# Patient Record
Sex: MALE | URBAN - METROPOLITAN AREA
[De-identification: names, ages, dates, MRNs, and addresses within clinical notes are randomized per-mention and may not be internally consistent; named-entity substitution may affect disease eponyms.]

---

## 2023-08-08 ENCOUNTER — APPOINTMENT (OUTPATIENT)
Dept: URBAN - METROPOLITAN AREA CLINIC 208 | Age: 48
Setting detail: DERMATOLOGY
End: 2023-08-10

## 2023-08-08 DIAGNOSIS — L72.8 OTHER FOLLICULAR CYSTS OF THE SKIN AND SUBCUTANEOUS TISSUE: ICD-10-CM

## 2023-08-08 PROCEDURE — OTHER INTRALESIONAL KENALOG: OTHER

## 2023-08-08 PROCEDURE — OTHER COUNSELING: OTHER

## 2023-08-08 PROCEDURE — OTHER ADDITIONAL NOTES: OTHER

## 2023-08-08 PROCEDURE — 11900 INJECT SKIN LESIONS </W 7: CPT

## 2023-08-08 PROCEDURE — OTHER MIPS QUALITY: OTHER

## 2023-08-08 ASSESSMENT — LOCATION SIMPLE DESCRIPTION DERM: LOCATION SIMPLE: RIGHT ANTERIOR NECK

## 2023-08-08 ASSESSMENT — LOCATION ZONE DERM: LOCATION ZONE: NECK

## 2023-08-08 ASSESSMENT — LOCATION DETAILED DESCRIPTION DERM: LOCATION DETAILED: RIGHT CLAVICULAR NECK

## 2023-08-08 NOTE — PROCEDURE: INTRALESIONAL KENALOG
Total Volume Injected (Ccs- Only Use Numbers And Decimals): .1
Consent: The risks of atrophy were reviewed with the patient.
How Many Mls Were Removed From The 40 Mg/Ml (5ml) Vial When Preparing The Injectable Solution?: 0
Administered By (Optional): Magnolia
Kenalog Preparation: Kenalog
Kenalog Type Of Vial: Multiple Dose
Require Ndc Code?: No
Medical Necessity Clause: This procedure was medically necessary because the lesions that were treated were:
Validate Note Data When Using Inventory: Yes
Show Inventory Tab: Hide
Detail Level: Zone
Concentration Of Solution Injected (Mg/Ml): 3.0

## 2023-08-08 NOTE — HPI: SKIN LESION
Is This A New Presentation, Or A Follow-Up?: Skin Lesion Lisa Pierre, ANASTASIA-    CP are unable to add patient tomorrow. Waiting to hear back from Dina to find out if Thursday would be a possibility. Please advise if okay to wait until Thursday or recommendation.     Left message for Dina Pedraza CHW to see if CP would be able to go to patients home tomorrow.    Spoke to Madelyn Peralta CP who informs they will not be able to see patient tomorrow. Advised to discuss with Dina to see if they would be able to add patient to schedule on Thursday.     Reminder set for this RN to follow-up with Dr. Fowler regarding patient labs.     Yudith GARCIA RN, BSN, PHN - PAL (patient advocate liaison)  [facility]  [phone]

## 2023-08-08 NOTE — PROCEDURE: ADDITIONAL NOTES
Additional Notes: Discussed treatment options: observation vs ILK  injection today vs I&D .\\nOpted to do ILK today .
Render Risk Assessment In Note?: no
Detail Level: Simple